# Patient Record
Sex: MALE | Race: WHITE | NOT HISPANIC OR LATINO
[De-identification: names, ages, dates, MRNs, and addresses within clinical notes are randomized per-mention and may not be internally consistent; named-entity substitution may affect disease eponyms.]

---

## 2020-10-07 ENCOUNTER — APPOINTMENT (OUTPATIENT)
Dept: OTOLARYNGOLOGY | Facility: CLINIC | Age: 60
End: 2020-10-07
Payer: COMMERCIAL

## 2020-10-07 VITALS
TEMPERATURE: 98.3 F | BODY MASS INDEX: 42.68 KG/M2 | DIASTOLIC BLOOD PRESSURE: 90 MMHG | SYSTOLIC BLOOD PRESSURE: 159 MMHG | HEIGHT: 64 IN | WEIGHT: 250 LBS | HEART RATE: 63 BPM | OXYGEN SATURATION: 95 %

## 2020-10-07 PROCEDURE — 99204 OFFICE O/P NEW MOD 45 MIN: CPT | Mod: 25

## 2020-10-07 PROCEDURE — 31575 DIAGNOSTIC LARYNGOSCOPY: CPT

## 2020-10-07 NOTE — PHYSICAL EXAM
[FreeTextEntry1] : Obese w YOAV DM and HBP for PSG [de-identified] : Large [Normal] : tympanic membranes are normal in both ears [] : septum deviated bilaterally [de-identified] : Large [de-identified] : Large [de-identified] : Large long uvula

## 2020-10-07 NOTE — PROCEDURE
[Congested] : congested [Image(s) Captured] : image(s) captured and filed [Topical Lidocaine] : topical lidocaine [Flexible Endoscope] : examined with the flexible endoscope [Serial Number: ___] : Serial Number: [unfilled] [de-identified] :  Deviated Nasal Septum.  Turbinate Hypertrophy.  Elongated Uvula.  Lingual tonsil hypertrophy.  Mild Tonsil Hypertrophy  Large long uvula

## 2020-10-07 NOTE — HISTORY OF PRESENT ILLNESS
[de-identified] : 59 years old male patient with history of Persistent snoring for the past 5 years.  Patient is present today in the office with C/O Loud snoring.  Observed episodes of stopped breathing during sleep. Abrupt awakenings accompanied by gasping or choking.  Awakening with a dry mouth or sore throat.  Deviated Nasal Septum.  Turbinate Hypertrophy.  Elongated Uvula.  Lingual tonsil hypertrophy.  Mild Tonsil Hypertrophy  \par

## 2020-10-07 NOTE — REASON FOR VISIT
[Initial Evaluation] : an initial evaluation for [FreeTextEntry2] : Persistent snoring for the past 5 years.  Patient states his level of severity is a level 8 out of 10 and it occurs constant.  Patient states nothing helps to improve or worsens his Persistent snoring for the past 5 years.

## 2020-10-25 ENCOUNTER — APPOINTMENT (OUTPATIENT)
Dept: SLEEP CENTER | Facility: HOME HEALTH | Age: 60
End: 2020-10-25
Payer: COMMERCIAL

## 2020-10-25 ENCOUNTER — OUTPATIENT (OUTPATIENT)
Dept: OUTPATIENT SERVICES | Facility: HOSPITAL | Age: 60
LOS: 1 days | End: 2020-10-25
Payer: COMMERCIAL

## 2020-10-25 PROCEDURE — 95800 SLP STDY UNATTENDED: CPT | Mod: 26

## 2020-10-25 PROCEDURE — 95800 SLP STDY UNATTENDED: CPT

## 2020-10-26 DIAGNOSIS — G47.33 OBSTRUCTIVE SLEEP APNEA (ADULT) (PEDIATRIC): ICD-10-CM

## 2020-11-17 ENCOUNTER — APPOINTMENT (OUTPATIENT)
Dept: OTOLARYNGOLOGY | Facility: CLINIC | Age: 60
End: 2020-11-17
Payer: COMMERCIAL

## 2020-11-17 VITALS
OXYGEN SATURATION: 100 % | SYSTOLIC BLOOD PRESSURE: 183 MMHG | TEMPERATURE: 97.8 F | DIASTOLIC BLOOD PRESSURE: 97 MMHG | HEART RATE: 61 BPM

## 2020-11-17 PROCEDURE — 99214 OFFICE O/P EST MOD 30 MIN: CPT

## 2020-11-17 NOTE — REASON FOR VISIT
[Subsequent Evaluation] : a subsequent evaluation for [FreeTextEntry2] :  Deviated Nasal Septum.  Turbinate Hypertrophy.  Elongated Uvula.  Lingual tonsil hypertrophy.  Mild Tonsil Hypertrophy  \par \par

## 2020-11-17 NOTE — PROCEDURE
[Flexible Endoscope] : examined with the flexible endoscope [Congested] : congested [de-identified] :  Deviated Nasal Septum.  Turbinate Hypertrophy.  Elongated Uvula.  Lingual tonsil hypertrophy.  Mild Tonsil Hypertrophy  Large long uvula

## 2020-11-17 NOTE — HISTORY OF PRESENT ILLNESS
[de-identified] : 59 years old male patient with history of Persistent snoring for the past 5 years.  Patient is present today in the office with.  Deviated Nasal Septum.  Turbinate Hypertrophy.  Elongated Uvula.  Lingual tonsil hypertrophy.  Mild Tonsil Hypertrophy.    Polysomnography interpreting  \par

## 2020-11-17 NOTE — PHYSICAL EXAM
[] : septum deviated bilaterally [Normal] : no rashes [FreeTextEntry1] : Obese w YOAV DM and HBP for PSG [de-identified] : Large [de-identified] : Large [de-identified] : Large [de-identified] : Large long uvula

## 2021-04-15 DIAGNOSIS — Z01.818 ENCOUNTER FOR OTHER PREPROCEDURAL EXAMINATION: ICD-10-CM

## 2021-04-20 ENCOUNTER — APPOINTMENT (OUTPATIENT)
Dept: DISASTER EMERGENCY | Facility: CLINIC | Age: 61
End: 2021-04-20

## 2021-04-20 RX ORDER — LIDOCAINE HYDROCHLORIDE 20 MG/ML
2 SOLUTION ORAL; TOPICAL
Qty: 1 | Refills: 4 | Status: ACTIVE | COMMUNITY
Start: 2021-04-20 | End: 1900-01-01

## 2021-04-20 RX ORDER — METHYLPREDNISOLONE 4 MG/1
4 TABLET ORAL
Qty: 1 | Refills: 0 | Status: ACTIVE | COMMUNITY
Start: 2021-04-20 | End: 1900-01-01

## 2021-04-21 ENCOUNTER — TRANSCRIPTION ENCOUNTER (OUTPATIENT)
Age: 61
End: 2021-04-21

## 2021-04-21 LAB — SARS-COV-2 N GENE NPH QL NAA+PROBE: NOT DETECTED

## 2021-04-22 ENCOUNTER — RESULT REVIEW (OUTPATIENT)
Age: 61
End: 2021-04-22

## 2021-04-22 ENCOUNTER — OUTPATIENT (OUTPATIENT)
Dept: OUTPATIENT SERVICES | Facility: HOSPITAL | Age: 61
LOS: 1 days | Discharge: HOME | End: 2021-04-22
Payer: COMMERCIAL

## 2021-04-22 ENCOUNTER — APPOINTMENT (OUTPATIENT)
Dept: OTOLARYNGOLOGY | Facility: AMBULATORY SURGERY CENTER | Age: 61
End: 2021-04-22

## 2021-04-22 LAB
GLUCOSE BLDC GLUCOMTR-MCNC: 123 MG/DL — HIGH (ref 70–99)
GLUCOSE BLDC GLUCOMTR-MCNC: 127 MG/DL — HIGH (ref 70–99)

## 2021-04-22 PROCEDURE — 42145 REPAIR PALATE PHARYNX/UVULA: CPT

## 2021-04-22 PROCEDURE — 88304 TISSUE EXAM BY PATHOLOGIST: CPT | Mod: 26

## 2021-04-22 PROCEDURE — 30802 ABLATE INF TURBINATE SUBMUC: CPT

## 2021-04-22 PROCEDURE — 30117 REMOVAL OF INTRANASAL LESION: CPT

## 2021-04-27 ENCOUNTER — APPOINTMENT (OUTPATIENT)
Dept: OTOLARYNGOLOGY | Facility: CLINIC | Age: 61
End: 2021-04-27
Payer: COMMERCIAL

## 2021-04-27 VITALS
WEIGHT: 248 LBS | SYSTOLIC BLOOD PRESSURE: 162 MMHG | BODY MASS INDEX: 42.34 KG/M2 | DIASTOLIC BLOOD PRESSURE: 73 MMHG | RESPIRATION RATE: 13 BRPM | HEIGHT: 64 IN | HEART RATE: 67 BPM | OXYGEN SATURATION: 99 % | TEMPERATURE: 97.3 F

## 2021-04-27 PROCEDURE — 99024 POSTOP FOLLOW-UP VISIT: CPT

## 2021-04-27 PROCEDURE — 31237 NSL/SINS NDSC SURG BX POLYPC: CPT | Mod: 58

## 2021-04-27 NOTE — REASON FOR VISIT
[Post-Operative Visit] : a post-operative visit [FreeTextEntry2] : Status post UPPP, Ablation Of Nasl Swell Bodies, Laser Assisted Control of Epistaxis

## 2021-04-27 NOTE — HISTORY OF PRESENT ILLNESS
[de-identified] : 60 years old male patient with history of Status post UPPP, Ablation Of Nasl Swell Bodies, Laser Assisted Control of Epistaxis.  Patient is present today in the office for nasal debridement and oral care.  patient was encouraged to increase oral hydration. Bacitracin ointment was inserted into patient  nostrils for lubrication.  Patient is haling according to plan

## 2021-04-27 NOTE — PROCEDURE
[Debridement] : debridement  [Bilateral] : bilateral debridement of the nasal cavity [Severe] : severe [Jorge] : on both sides [Removed] : which was removed

## 2021-04-27 NOTE — PHYSICAL EXAM
[Normal] : no rashes [FreeTextEntry1] : Obese w YOAV DM and HBP for PSG [de-identified] : Large [] : septum deviated bilaterally [de-identified] : Large [de-identified] : Large [de-identified] : Large long uvula

## 2021-05-02 LAB — SURGICAL PATHOLOGY STUDY: SIGNIFICANT CHANGE UP

## 2021-05-12 ENCOUNTER — APPOINTMENT (OUTPATIENT)
Dept: OTOLARYNGOLOGY | Facility: CLINIC | Age: 61
End: 2021-05-12
Payer: COMMERCIAL

## 2021-05-12 VITALS
RESPIRATION RATE: 13 BRPM | OXYGEN SATURATION: 97 % | SYSTOLIC BLOOD PRESSURE: 162 MMHG | HEART RATE: 65 BPM | TEMPERATURE: 97.3 F | DIASTOLIC BLOOD PRESSURE: 84 MMHG

## 2021-05-12 DIAGNOSIS — G47.9 SLEEP DISORDER, UNSPECIFIED: ICD-10-CM

## 2021-05-12 PROCEDURE — 31237 NSL/SINS NDSC SURG BX POLYPC: CPT | Mod: 50,58

## 2021-05-12 PROCEDURE — 99024 POSTOP FOLLOW-UP VISIT: CPT

## 2021-05-12 RX ORDER — ACETAMINOPHEN AND CODEINE 300; 30 MG/1; MG/1
300-30 TABLET ORAL
Qty: 30 | Refills: 0 | Status: COMPLETED | COMMUNITY
Start: 2021-04-20 | End: 2021-05-12

## 2021-05-12 RX ORDER — AMOXICILLIN 500 MG/1
500 CAPSULE ORAL
Qty: 14 | Refills: 0 | Status: COMPLETED | COMMUNITY
Start: 2021-04-20 | End: 2021-05-12

## 2021-05-12 RX ORDER — DOCUSATE SODIUM 100 MG/1
100 CAPSULE ORAL TWICE DAILY
Qty: 1 | Refills: 0 | Status: COMPLETED | COMMUNITY
Start: 2021-04-20 | End: 2021-05-12

## 2021-05-12 NOTE — REASON FOR VISIT
[Post-Operative Visit] : a post-operative visit [FreeTextEntry2] : Status post UPPP, Ablation Of Nasal Swell Bodies, Laser Assisted Control of Epistaxis

## 2021-05-12 NOTE — PHYSICAL EXAM
[] : septum deviated bilaterally [Normal] : no rashes [FreeTextEntry1] : Obese w YOAV DM and HBP for PSG [de-identified] : Large [de-identified] : Large [de-identified] : Large [de-identified] : Large long uvula

## 2021-05-12 NOTE — HISTORY OF PRESENT ILLNESS
[de-identified] : 60 years old male patient with history of Status post UPPP, Ablation Of Nasl Swell Bodies, Laser Assisted Control of Epistaxis.  Patient is present today in the office for nasal debridement and oral care.  patient was encouraged to increase oral hydration. Bacitracin ointment was inserted into patient  nostrils for lubrication.  Patient is haling according to plan

## 2021-05-16 ENCOUNTER — EMERGENCY (EMERGENCY)
Facility: HOSPITAL | Age: 61
LOS: 1 days | Discharge: HOME | End: 2021-05-16
Attending: EMERGENCY MEDICINE | Admitting: EMERGENCY MEDICINE
Payer: COMMERCIAL

## 2021-05-16 VITALS
DIASTOLIC BLOOD PRESSURE: 67 MMHG | HEART RATE: 78 BPM | RESPIRATION RATE: 19 BRPM | OXYGEN SATURATION: 100 % | SYSTOLIC BLOOD PRESSURE: 156 MMHG | TEMPERATURE: 98 F

## 2021-05-16 VITALS
DIASTOLIC BLOOD PRESSURE: 75 MMHG | TEMPERATURE: 98 F | WEIGHT: 250 LBS | SYSTOLIC BLOOD PRESSURE: 174 MMHG | RESPIRATION RATE: 19 BRPM | OXYGEN SATURATION: 100 % | HEART RATE: 88 BPM | HEIGHT: 64 IN

## 2021-05-16 DIAGNOSIS — R04.0 EPISTAXIS: ICD-10-CM

## 2021-05-16 DIAGNOSIS — I10 ESSENTIAL (PRIMARY) HYPERTENSION: ICD-10-CM

## 2021-05-16 DIAGNOSIS — E11.9 TYPE 2 DIABETES MELLITUS WITHOUT COMPLICATIONS: ICD-10-CM

## 2021-05-16 LAB
ALBUMIN SERPL ELPH-MCNC: 4.2 G/DL — SIGNIFICANT CHANGE UP (ref 3.3–5)
ALP SERPL-CCNC: 62 U/L — SIGNIFICANT CHANGE UP (ref 40–120)
ALT FLD-CCNC: 17 U/L — SIGNIFICANT CHANGE UP (ref 10–45)
ANION GAP SERPL CALC-SCNC: 11 MMOL/L — SIGNIFICANT CHANGE UP (ref 5–17)
APTT BLD: 31.9 SEC — SIGNIFICANT CHANGE UP (ref 27.5–35.5)
AST SERPL-CCNC: 16 U/L — SIGNIFICANT CHANGE UP (ref 10–40)
BASOPHILS # BLD AUTO: 0.03 K/UL — SIGNIFICANT CHANGE UP (ref 0–0.2)
BASOPHILS NFR BLD AUTO: 0.2 % — SIGNIFICANT CHANGE UP (ref 0–2)
BILIRUB SERPL-MCNC: 0.4 MG/DL — SIGNIFICANT CHANGE UP (ref 0.2–1.2)
BLD GP AB SCN SERPL QL: NEGATIVE — SIGNIFICANT CHANGE UP
BUN SERPL-MCNC: 15 MG/DL — SIGNIFICANT CHANGE UP (ref 7–23)
CALCIUM SERPL-MCNC: 9.4 MG/DL — SIGNIFICANT CHANGE UP (ref 8.4–10.5)
CHLORIDE SERPL-SCNC: 102 MMOL/L — SIGNIFICANT CHANGE UP (ref 96–108)
CO2 SERPL-SCNC: 25 MMOL/L — SIGNIFICANT CHANGE UP (ref 22–31)
CREAT SERPL-MCNC: 0.57 MG/DL — SIGNIFICANT CHANGE UP (ref 0.5–1.3)
EOSINOPHIL # BLD AUTO: 0.11 K/UL — SIGNIFICANT CHANGE UP (ref 0–0.5)
EOSINOPHIL NFR BLD AUTO: 0.8 % — SIGNIFICANT CHANGE UP (ref 0–6)
GLUCOSE SERPL-MCNC: 127 MG/DL — HIGH (ref 70–99)
HCT VFR BLD CALC: 41.7 % — SIGNIFICANT CHANGE UP (ref 39–50)
HGB BLD-MCNC: 13.9 G/DL — SIGNIFICANT CHANGE UP (ref 13–17)
IMM GRANULOCYTES NFR BLD AUTO: 0.3 % — SIGNIFICANT CHANGE UP (ref 0–1.5)
INR BLD: 1.09 — SIGNIFICANT CHANGE UP (ref 0.88–1.16)
LYMPHOCYTES # BLD AUTO: 27.9 % — SIGNIFICANT CHANGE UP (ref 13–44)
LYMPHOCYTES # BLD AUTO: 3.65 K/UL — HIGH (ref 1–3.3)
MCHC RBC-ENTMCNC: 27.9 PG — SIGNIFICANT CHANGE UP (ref 27–34)
MCHC RBC-ENTMCNC: 33.3 GM/DL — SIGNIFICANT CHANGE UP (ref 32–36)
MCV RBC AUTO: 83.6 FL — SIGNIFICANT CHANGE UP (ref 80–100)
MONOCYTES # BLD AUTO: 0.96 K/UL — HIGH (ref 0–0.9)
MONOCYTES NFR BLD AUTO: 7.3 % — SIGNIFICANT CHANGE UP (ref 2–14)
NEUTROPHILS # BLD AUTO: 8.28 K/UL — HIGH (ref 1.8–7.4)
NEUTROPHILS NFR BLD AUTO: 63.5 % — SIGNIFICANT CHANGE UP (ref 43–77)
NRBC # BLD: 0 /100 WBCS — SIGNIFICANT CHANGE UP (ref 0–0)
PLATELET # BLD AUTO: 301 K/UL — SIGNIFICANT CHANGE UP (ref 150–400)
POTASSIUM SERPL-MCNC: 3.8 MMOL/L — SIGNIFICANT CHANGE UP (ref 3.5–5.3)
POTASSIUM SERPL-SCNC: 3.8 MMOL/L — SIGNIFICANT CHANGE UP (ref 3.5–5.3)
PROT SERPL-MCNC: 7.6 G/DL — SIGNIFICANT CHANGE UP (ref 6–8.3)
PROTHROM AB SERPL-ACNC: 13 SEC — SIGNIFICANT CHANGE UP (ref 10.6–13.6)
RBC # BLD: 4.99 M/UL — SIGNIFICANT CHANGE UP (ref 4.2–5.8)
RBC # FLD: 14.3 % — SIGNIFICANT CHANGE UP (ref 10.3–14.5)
RH IG SCN BLD-IMP: POSITIVE — SIGNIFICANT CHANGE UP
SODIUM SERPL-SCNC: 138 MMOL/L — SIGNIFICANT CHANGE UP (ref 135–145)
WBC # BLD: 13.07 K/UL — HIGH (ref 3.8–10.5)
WBC # FLD AUTO: 13.07 K/UL — HIGH (ref 3.8–10.5)

## 2021-05-16 PROCEDURE — 36415 COLL VENOUS BLD VENIPUNCTURE: CPT

## 2021-05-16 PROCEDURE — 85025 COMPLETE CBC W/AUTO DIFF WBC: CPT

## 2021-05-16 PROCEDURE — 86850 RBC ANTIBODY SCREEN: CPT

## 2021-05-16 PROCEDURE — 99284 EMERGENCY DEPT VISIT MOD MDM: CPT | Mod: 25

## 2021-05-16 PROCEDURE — 80053 COMPREHEN METABOLIC PANEL: CPT

## 2021-05-16 PROCEDURE — 85610 PROTHROMBIN TIME: CPT

## 2021-05-16 PROCEDURE — 85730 THROMBOPLASTIN TIME PARTIAL: CPT

## 2021-05-16 PROCEDURE — 86901 BLOOD TYPING SEROLOGIC RH(D): CPT

## 2021-05-16 PROCEDURE — 86900 BLOOD TYPING SEROLOGIC ABO: CPT

## 2021-05-16 PROCEDURE — 30901 CONTROL OF NOSEBLEED: CPT | Mod: RT

## 2021-05-16 PROCEDURE — 99284 EMERGENCY DEPT VISIT MOD MDM: CPT

## 2021-05-16 RX ORDER — OXYMETAZOLINE HYDROCHLORIDE 0.5 MG/ML
2 SPRAY NASAL ONCE
Refills: 0 | Status: COMPLETED | OUTPATIENT
Start: 2021-05-16 | End: 2021-05-16

## 2021-05-16 RX ADMIN — OXYMETAZOLINE HYDROCHLORIDE 2 SPRAY(S): 0.5 SPRAY NASAL at 01:19

## 2021-05-16 RX ADMIN — Medication 1 APPLICATION(S): at 01:19

## 2021-05-16 NOTE — CONSULT NOTE ADULT - ASSESSMENT
60yoM on ASA with acute epistaxis.  Currently hemostatic with one rhino rocket in right nares, no longer has any sensation of dripping blood after coughing up large clot of blood from nasopharynx. Observed briefly in ED with no return of epistaxis.  - Augmentin while rhino rocket in place, recommend coverage to Wednesday  - Please have pt follow up with Dr. Vasquez for removal of nasal packing  - Please page ENT with any questions or concerns

## 2021-05-16 NOTE — ED ADULT NURSE NOTE - NSIMPLEMENTINTERV_GEN_ALL_ED
none
Implemented All Universal Safety Interventions:  [place] to call system. Call bell, personal items and telephone within reach. Instruct patient to call for assistance. Room bathroom lighting operational. Non-slip footwear when patient is off stretcher. Physically safe environment: no spills, clutter or unnecessary equipment. Stretcher in lowest position, wheels locked, appropriate side rails in place.

## 2021-05-16 NOTE — ED ADULT NURSE NOTE - OBJECTIVE STATEMENT
received A&Ox3 60years old male pt with c/o of " this morning I was bending down to tie my shoes and I started having nose bleed." pt went to the local hospital near house, and currently, presents to the ED with two balloons inserted bilaterally. pt has some blood backed up in the throat and actively tries to cough it out. pt is able to talk in full sentences and able to swallow. no trauma in the head, dizziness, SOB, fever, n/v/d. pt is not on blood thinner

## 2021-05-16 NOTE — ED PROVIDER NOTE - ENMT, MLM
Airway patent, Nasal mucosa clear. Mouth with normal mucosa. Throat has no vesicles, no oropharyngeal exudates and uvula is midline.  rhino rockets in bilat nares with blood anteriorly and in throat  + clots

## 2021-05-16 NOTE — ED PROVIDER NOTE - CLINICAL SUMMARY MEDICAL DECISION MAKING FREE TEXT BOX
PRE-SEDATION ASSESSMENT    CONSENT  Consent for procedure and sedation obtained: Yes    MEDICAL HISTORY       PHYSICAL EXAM  History and Physical Reviewed: H&P completed today  Airway Risk History: No previous complications  Airway Anatomy : Class II  Heart : Normal  Lungs : Normal  LOC/Mental Status : Normal    OTHER FINDINGS  Reviewed current medications and allergies: Yes  Pertinent lab/diagnostic test reviewed: Yes    SEDATION RISK ASSESSMENT  Risk Status ASA: Class I - Normal, healthy patient  Plan for Sedation: Moderate Sedation  Indications for Procedure/Pre-Procedure Diagnosis and Planned Procedure: EGD for epigastric pain  EKG Monitoring: Yes    NARRATIVE FINDINGS     
59 yo male with epistaxis from bilat nares- had rhinorockets placed 6 hrs earlier at outside hosp-  left rocket removed - area of bleeding cauterized by ENT res - VSS no further bleeding- may dc on augmentin per ENT

## 2021-05-16 NOTE — ED PROVIDER NOTE - PATIENT PORTAL LINK FT
You can access the FollowMyHealth Patient Portal offered by [facility] by registering at the following website: [email or web address]. By joining [employer]s FollowMyHealth portal, you will also be able to view your health information using other applications (apps) compatible with our system.

## 2021-05-16 NOTE — ED PROVIDER NOTE - CARE PROVIDER_API CALL
Dawit Vasquez)  Otolaryngology  [address], [address]  [place], [place] [address]  Phone: [phone]  Fax: [phone]  Follow Up Time: 1-3 Days

## 2021-05-16 NOTE — ED PROVIDER NOTE - OBJECTIVE STATEMENT
59 yo M with hx DM HTN on ASA  with epistaxis at 4 pm today no fevers or chills - seen at ED [email or web address] had 2 rhinorockets placed - now with bleeding anteriorly and swallowing blood too  - had sleep surgery in April for deviated septum

## 2021-05-16 NOTE — CONSULT NOTE ADULT - SUBJECTIVE AND OBJECTIVE BOX
60yoM on ASA presents with acute right epistaxis.  Of note he also had septoplasty with bilateral turbinate reduction and uvuloplasty with Dr. Vasquez three weeks ago.  He states that this afternoon he was bending over to tie his shoe which resulted in a few drops of blood from his right nares.  A little later he had a second episode of bleeding from the right side for which he went to the emergency room [facility].  While in the ED he states that he had probably about five ounces of bleeding.  The ED placed a right rhino rocket at first then blood started coming out of the left side too so they placed a second rhino rocket.  He was subsequently discharged, however he continued to have dripping down the back of his throat which concerned him so he presented to the ED at [facility] at the recommendation of his primary care physicial.  Denies any weakness or dizziness.  Had one similar episode of epistaxis 10 years ago with similar management.    PMHx: HTN, HLD   PSHx: septoplasty BITR uvuloplasty, R knee surgery  Meds: per chart  Allergies: NKDA    Physical Exam:  General: NAD, pleasant, conversant  Head: NCAT, facial movement grossly symmetric  Eyes: EOMI  Nose: bilateral rhino rockets in place.  Left rhino rocket removed atraumatically and the left nasal cavity found to have no evidence of bleeding.  Right rhino rocket remains in place.   OC/OP: MMM, tongue midline, uvuloplasty well healed, no bleeding at posterior pharyngeal wall  Neck: soft, supple, no palpable LAD or masses    05-16    138  |  102  |  15  ----------------------------<  127<H>  3.8   |  25  |  0.57    Ca    9.4      16 May 2021 01:02    TPro  7.6  /  Alb  4.2  /  TBili  0.4  /  DBili  x   /  AST  16  /  ALT  17  /  AlkPhos  62  05-16                          13.9   13.07 )-----------( 301      ( 16 May 2021 01:02 )             41.7

## 2021-05-16 NOTE — ED ADULT TRIAGE NOTE - HEIGHT IN INCHES
[FreeTextEntry1] : INR=3.8  \par advised pt to increase dietary intake of Vitamin K  ** pt states will have one extra acai bowl/week**\par repeat INR 1 week \par \par \par \par Start OTC B12 1000mcg effective immediately  as was previously recommended 1/2019 \par Increase Januvia 50mg qd to 100mg qd.  \par \par repeat FULL COMPLETE FBW including  B12 level in 1M (appt scheduled) \par  4

## 2021-05-17 ENCOUNTER — APPOINTMENT (OUTPATIENT)
Dept: OTOLARYNGOLOGY | Facility: CLINIC | Age: 61
End: 2021-05-17
Payer: COMMERCIAL

## 2021-05-17 VITALS
DIASTOLIC BLOOD PRESSURE: 85 MMHG | HEART RATE: 76 BPM | TEMPERATURE: 97.3 F | WEIGHT: 248 LBS | OXYGEN SATURATION: 98 % | SYSTOLIC BLOOD PRESSURE: 160 MMHG | HEIGHT: 64 IN | BODY MASS INDEX: 42.34 KG/M2

## 2021-05-17 PROCEDURE — 99024 POSTOP FOLLOW-UP VISIT: CPT

## 2021-05-17 PROCEDURE — 31237 NSL/SINS NDSC SURG BX POLYPC: CPT | Mod: 50,58

## 2021-05-17 NOTE — PHYSICAL EXAM
[Midline] : trachea located in midline position [Nasal Endoscopy Performed] : nasal endoscopy was performed, see procedure section for findings [Normal] : no abnormal secretions [de-identified] : RIGHT rhinor rocket removed.  Pt observed for 20 min without epistaxis

## 2021-05-17 NOTE — ASSESSMENT
[FreeTextEntry1] : 61 yo male who presents with epistaxis.  No bleeding x 2 days and therefore rhinorocket was removed.  The nasal cavity was anesthetized and decongested.  Next we debrided the nasal cavity clean bilaterally.  After 20min of observation the patient was re-evaluated and no bleeding observed.  Bacitracin was placed and epistaxis protocol was reviewed.  Pt knows to go to the ED if bleeding recurs otherwise follow up with Dr. Vasquez tomorrow.\par \par - epistaxis protocol\par - fu with Dr. Pedro jones

## 2021-05-17 NOTE — PROCEDURE
[FreeTextEntry6] : Nasal Debridement\par Procedure Note\par   \par Pre-operative Diagnosis: Sinusitis, status post nasal surgery, now with epistaxis\par Post-operative Diagnosis: no epistaxis\par Anesthesia: Topical\par Procedure: Bilateral nasal/sinus debridement\par   \par Procedure Details: \par After topical anesthesia and decongestant, the patient was placed in the supine position. The telescope was passed along the left nasal floor to the nasopharynx. It was then passed into the region of the middle meatus, middle turbinate, and the sphenoethmoid region.  Bilaterally the nasal cavity was cleaned using a mixture of instruments including suction as well as bayonet forceps and Blakesley sinus instruments.  An identical procedure was performed on the right side. \par   \par Findings: \par Mucosa: 	                minimal maceration of mucosa from rhinorocket	\par Nasal septum: 	midline 	\par Discharge: 	blood clot suctioned clear 	\par Turbinates: 	surgically reduced and outfractured 	\par Adenoid: 	                normal	\par Posterior choanae: 	normal	\par Eustachian tubes: 	normal	\par Mucous stranding: 	normal 	\par Lesions:        	Not present	\par   \par Comments: \par Significant debris and blood was suctioned and cleaned from the ostiomeatal complex bilaterally. The patient noted increased ability to breathe through their nose at the end of the procedure and there was no evidence of any epistaxis.\par   \par Condition:\par Stable. Patient tolerated procedure well.\par   \par Complications:\par None\par \par

## 2021-05-17 NOTE — HISTORY OF PRESENT ILLNESS
[de-identified] : 60M, on 81mg ASA for prevention, who is a patient of Dr. Vasquez, who presents after epistaxis x 3 days. He explains he had surgery with Dr. Vasquez  4/2221 for a UPPP, septoplasty, swell body ablation and turbinate reduction.  He has done well post operatively.   \par \par Two days ago, he was away in [facility] when he developed epistaxis. He explains that it came from both sides an he tried pinching his nose, but it continued to bleed so he went to the nearest ER [facility] where bilateral rhino rockets were placed.  He was sent home and drove home the next day when he began to have blood dripping into the back of his throat which prompted him to [facility] ER. There, the left rhino rocket was removed without further bleeding but the right was left. He explains that he began having right sided headache and blurred vision which prompted him to come in today to be seen sooner to remove the rhino rocket. He has since stopped the ASA.\par \par He denies any other ENT complaints. No pertinent FH/Sh.

## 2021-05-18 ENCOUNTER — APPOINTMENT (OUTPATIENT)
Dept: OTOLARYNGOLOGY | Facility: CLINIC | Age: 61
End: 2021-05-18
Payer: COMMERCIAL

## 2021-05-18 VITALS
SYSTOLIC BLOOD PRESSURE: 168 MMHG | OXYGEN SATURATION: 97 % | TEMPERATURE: 97.3 F | DIASTOLIC BLOOD PRESSURE: 77 MMHG | HEART RATE: 81 BPM

## 2021-05-18 DIAGNOSIS — Z98.890 OTHER SPECIFIED POSTPROCEDURAL STATES: ICD-10-CM

## 2021-05-18 PROCEDURE — 31237 NSL/SINS NDSC SURG BX POLYPC: CPT | Mod: 50,58

## 2021-05-18 PROCEDURE — 30901 CONTROL OF NOSEBLEED: CPT | Mod: RT,78

## 2021-05-18 PROCEDURE — 99024 POSTOP FOLLOW-UP VISIT: CPT

## 2021-05-18 NOTE — REVIEW OF SYSTEMS
[Patient Intake Form Reviewed] : Patient intake form was reviewed [As Noted in HPI] : as noted in HPI [Nasal Congestion] : nasal congestion [Nose Bleeds] : nose bleeds [Problem Snoring] : problem snoring [Throat Pain] : throat pain [Throat Dryness] : throat dryness [Negative] : Heme/Lymph [FreeTextEntry1] : all other ROS negative

## 2021-05-18 NOTE — PHYSICAL EXAM
[Nasal Endoscopy Performed] : nasal endoscopy was performed, see procedure section for findings [Midline] : trachea located in midline position [Normal] : no rashes [de-identified] : RIGHT rhinor rocket removed.  Pt observed for 20 min without epistaxis [de-identified] : Scoped, Agatha Sinus applied to Rt side Merocel inserted Lt side dry

## 2021-05-18 NOTE — HISTORY OF PRESENT ILLNESS
[de-identified] : 60 years old male patient with history of , on 81mg ASA for preventio.  History of  epistaxis x 3 days. epistaxis status post UPPP, Ablation of Nasal Swell Bodies,  Laser Assisted Reduction of Turbinates and Elevoplasty.\par Several days ago, he was away in [facility] when he developed epistaxis. He explains that it came from both sides an he tried pinching his nose, but it continued to bleed so he went to the nearest ER [facility] where bilateral rhino rockets were placed.  He was sent home and drove home the next day when he began to have blood dripping into the back of his throat which prompted him to [facility] ER. There, the left rhino rocket was removed without further bleeding but the right was left. He explains that he began having right sided headache and blurred vision which prompted him to come in today to be seen sooner to remove the rhino rocket. He has since stopped the ASA.  Patient  denies any other ENT complaints. No pertinent FH/Sh.  PuraSinus   5 ml . Absorbable  Nasal Hemostat was inserted into both nostrils

## 2021-05-18 NOTE — PROCEDURE
[Image(s) Captured] : image(s) captured and filed [Debridement] : debridement  [Topical Lidocaine] : topical lidocaine [Rigid Endoscope] : examined with a rigid endoscope [Serial Number: ___] : Serial Number: [unfilled] [Bilateral] : bilateral debridement of the nasal cavity [Severe] : severe [Jorge] : on both sides [Removed] : which was removed [FreeTextEntry6] : \par   Scoped, Agatha Sinus applied to Rt side Merocel inserted Lt side dry\par \par \par

## 2021-05-18 NOTE — REASON FOR VISIT
[Post-Operative Visit] : a post-operative visit [FreeTextEntry2] : epistaxis status post UPPP, Ablation of Nasal Swell Bodies,  Laser Assisted Reduction of Turbinates and Elevoplasty

## 2021-05-19 ENCOUNTER — APPOINTMENT (OUTPATIENT)
Dept: OTOLARYNGOLOGY | Facility: CLINIC | Age: 61
End: 2021-05-19
Payer: COMMERCIAL

## 2021-05-19 VITALS
SYSTOLIC BLOOD PRESSURE: 177 MMHG | TEMPERATURE: 97.6 F | OXYGEN SATURATION: 98 % | HEART RATE: 70 BPM | DIASTOLIC BLOOD PRESSURE: 84 MMHG

## 2021-05-19 PROCEDURE — 99024 POSTOP FOLLOW-UP VISIT: CPT

## 2021-05-19 PROCEDURE — 31237 NSL/SINS NDSC SURG BX POLYPC: CPT | Mod: 50,58

## 2021-05-19 NOTE — PHYSICAL EXAM
[Nasal Endoscopy Performed] : nasal endoscopy was performed, see procedure section for findings [Normal] : no rashes [FreeTextEntry1] : Obese w YOAV DM and HBP for PSG [de-identified] : Large [de-identified] : RIGHT rhinor rocket removed.  Pt observed for 20 min without epistaxis [de-identified] : Scoped, Agatha Sinus applied to Rt side Merocel inserted Lt side dry [de-identified] : Large [de-identified] : Large long uvula

## 2021-05-19 NOTE — PROCEDURE
[Image(s) Captured] : image(s) captured and filed [Debridement] : debridement  [Topical Lidocaine] : topical lidocaine [Rigid Endoscope] : examined with a rigid endoscope [Serial Number: ___] : Serial Number: [unfilled] [Nasal Septum Mucosa Bleeding] : bilateral bleeding [Bilateral] : bilateral debridement of the nasal cavity [Severe] : severe [Jorge] : on both sides [Removed] : which was removed [Cauterized w/Silver Nitrate] : the bleeding was not cauterized with silver nitrate [FreeTextEntry6] : \par   Scoped, Agatha Sinus applied to Rt side.\par \par \par

## 2021-05-19 NOTE — HISTORY OF PRESENT ILLNESS
[de-identified] : 60 years old male patient with history of , on 81mg ASA for preventio.  History of  epistaxis x 3 days. epistaxis status post UPPP, Ablation of Nasal Swell Bodies,  Laser Assisted Reduction of Turbinates and Elevoplasty.\par Several days ago, he was away in [facility] when he developed epistaxis. He explains that it came from both sides an he tried pinching his nose, but it continued to bleed so he went to the nearest ER [facility] where bilateral rhino rockets were placed.  He was sent home and drove home the next day when he began to have blood dripping into the back of his throat which prompted him to [facility] ER. There, the left rhino rocket was removed without further bleeding but the right was left. He explains that he began having right sided headache and blurred vision which prompted him to come in today to be seen sooner to remove the rhino rocket. He has since stopped the ASA.  Patient  denies any other ENT complaints.  PuraSinus   5 ml . Absorbable  Nasal Hemostat was inserted into the right side.   Merocel standard  nasal dressing was removed from the right nostrils.

## 2021-05-26 ENCOUNTER — APPOINTMENT (OUTPATIENT)
Dept: OTOLARYNGOLOGY | Facility: CLINIC | Age: 61
End: 2021-05-26
Payer: COMMERCIAL

## 2021-05-26 VITALS
OXYGEN SATURATION: 98 % | SYSTOLIC BLOOD PRESSURE: 173 MMHG | DIASTOLIC BLOOD PRESSURE: 96 MMHG | TEMPERATURE: 96.8 F | HEART RATE: 72 BPM

## 2021-05-26 PROCEDURE — 99024 POSTOP FOLLOW-UP VISIT: CPT

## 2021-05-26 PROCEDURE — 31231 NASAL ENDOSCOPY DX: CPT | Mod: 58

## 2021-05-26 NOTE — PHYSICAL EXAM
[Nasal Endoscopy Performed] : nasal endoscopy was performed, see procedure section for findings [Normal] : no rashes [FreeTextEntry1] : Obese w YOAV DM and HBP for PSG [de-identified] : Large [de-identified] : RIGHT rhinor rocket removed.  Pt observed for 20 min without epistaxis [de-identified] : Scoped, Agatha Sinus applied to Rt side Merocel inserted Lt side dry [de-identified] : Large [de-identified] : Large long uvula

## 2021-05-26 NOTE — REVIEW OF SYSTEMS
[Patient Intake Form Reviewed] : Patient intake form was reviewed [As Noted in HPI] : as noted in HPI [Nasal Congestion] : nasal congestion [Nose Bleeds] : nose bleeds [Negative] : Heme/Lymph [Problem Snoring] : no snoring problems [Throat Pain] : no throat pain [Throat Dryness] : no throat dryness [FreeTextEntry1] : all other ROS negative

## 2021-05-26 NOTE — HISTORY OF PRESENT ILLNESS
[de-identified] : 60 years old male patient with history of Epistaxis status post UPPP, Ablation of Nasal Swell Bodies,  Laser Assisted Reduction of Turbinates and Elevoplasty.  Patient is present today in the office for nasal debridement.  Bacitracin ointment was inserted into both nostrils for lubrication \par

## 2021-05-26 NOTE — PROCEDURE
[Image(s) Captured] : image(s) captured and filed [Debridement] : debridement  [Epistaxis] : evaluation of epistaxis [Topical Lidocaine] : topical lidocaine [Rigid Endoscope] : examined with a rigid endoscope [Serial Number: ___] : Serial Number: [unfilled] [Nasal Septum Mucosa Bleeding] : bilateral bleeding [Bilateral] : bilateral debridement of the nasal cavity [Moderate] : moderate [Jorge] : on both sides [Removed] : which was removed [FreeTextEntry6] : \par \par \par \par

## 2021-06-16 ENCOUNTER — APPOINTMENT (OUTPATIENT)
Dept: OTOLARYNGOLOGY | Facility: CLINIC | Age: 61
End: 2021-06-16
Payer: COMMERCIAL

## 2021-06-16 VITALS
DIASTOLIC BLOOD PRESSURE: 83 MMHG | HEART RATE: 67 BPM | TEMPERATURE: 97.2 F | OXYGEN SATURATION: 98 % | SYSTOLIC BLOOD PRESSURE: 183 MMHG

## 2021-06-16 DIAGNOSIS — G47.33 OBSTRUCTIVE SLEEP APNEA (ADULT) (PEDIATRIC): ICD-10-CM

## 2021-06-16 DIAGNOSIS — J34.3 HYPERTROPHY OF NASAL TURBINATES: ICD-10-CM

## 2021-06-16 PROCEDURE — 99024 POSTOP FOLLOW-UP VISIT: CPT

## 2021-06-16 NOTE — PHYSICAL EXAM
[Nasal Endoscopy Performed] : nasal endoscopy was performed, see procedure section for findings [Normal] : no rashes [FreeTextEntry1] : Obese w YOAV DM and HBP for PSG [de-identified] : Large [de-identified] : RIGHT rhinor rocket removed.  Pt observed for 20 min without epistaxis [de-identified] : Scoped, Agatha Sinus applied to Rt side Merocel inserted Lt side dry [de-identified] : Large [de-identified] : Large long uvula

## 2021-06-16 NOTE — HISTORY OF PRESENT ILLNESS
[de-identified] : 60 years old male patient with history of Epistaxis status post UPPP, Ablation of Nasal Swell Bodies,  Laser Assisted Reduction of Turbinates and Elevoplasty.  \par

## 2021-06-16 NOTE — PROCEDURE
[Debridement] : debridement  [Epistaxis] : evaluation of epistaxis [Nasal Septum Mucosa Bleeding] : bilateral bleeding [Removed] : which was not removed [FreeTextEntry6] : \par \par \par \par

## 2021-09-08 ENCOUNTER — APPOINTMENT (OUTPATIENT)
Dept: OTOLARYNGOLOGY | Facility: CLINIC | Age: 61
End: 2021-09-08

## 2022-05-10 NOTE — ASSESSMENT
[FreeTextEntry1] : 61 yo male who presents with epistaxis.  No bleeding x 2 days and therefore rhinorocket was removed.  The nasal cavity was anesthetized and decongested.  Next we debrided the nasal cavity clean bilaterally.  After 20min of observation the patient was re-evaluated and no bleeding observed.  Bacitracin was placed and epistaxis protocol was reviewed.  Pt knows to go to the ED if bleeding recurs otherwise follow up with Dr. Vasquez tomorrow.\par \par - epistaxis protocol\par - fu with Dr. Pedro jones Subsequent Stages Histo Method Verbiage: Using a similar technique to that described above, a thin layer of tissue was removed from all areas where tumor was visible on the previous stage.  The tissue was again oriented, mapped, dyed, and processed as above.

## 2022-12-28 ENCOUNTER — NON-APPOINTMENT (OUTPATIENT)
Age: 62
End: 2022-12-28

## 2023-11-08 ENCOUNTER — APPOINTMENT (OUTPATIENT)
Dept: OTOLARYNGOLOGY | Facility: CLINIC | Age: 63
End: 2023-11-08
Payer: COMMERCIAL

## 2023-11-08 VITALS — SYSTOLIC BLOOD PRESSURE: 158 MMHG | OXYGEN SATURATION: 97 % | HEART RATE: 84 BPM | DIASTOLIC BLOOD PRESSURE: 85 MMHG

## 2023-11-08 PROCEDURE — 99214 OFFICE O/P EST MOD 30 MIN: CPT | Mod: 25

## 2023-11-08 PROCEDURE — 30903 CONTROL OF NOSEBLEED: CPT | Mod: RT

## 2023-11-09 ENCOUNTER — APPOINTMENT (OUTPATIENT)
Dept: OTOLARYNGOLOGY | Facility: CLINIC | Age: 63
End: 2023-11-09
Payer: COMMERCIAL

## 2023-11-09 VITALS
HEART RATE: 74 BPM | WEIGHT: 230 LBS | DIASTOLIC BLOOD PRESSURE: 79 MMHG | OXYGEN SATURATION: 99 % | BODY MASS INDEX: 39.27 KG/M2 | RESPIRATION RATE: 13 BRPM | TEMPERATURE: 97.2 F | SYSTOLIC BLOOD PRESSURE: 137 MMHG | HEIGHT: 64 IN

## 2023-11-09 DIAGNOSIS — J34.2 DEVIATED NASAL SEPTUM: ICD-10-CM

## 2023-11-09 DIAGNOSIS — J34.89 OTHER SPECIFIED DISORDERS OF NOSE AND NASAL SINUSES: ICD-10-CM

## 2023-11-09 DIAGNOSIS — R04.0 EPISTAXIS: ICD-10-CM

## 2023-11-09 PROCEDURE — 30903 CONTROL OF NOSEBLEED: CPT | Mod: RT
